# Patient Record
Sex: MALE | Race: WHITE | Employment: FULL TIME | URBAN - METROPOLITAN AREA
[De-identification: names, ages, dates, MRNs, and addresses within clinical notes are randomized per-mention and may not be internally consistent; named-entity substitution may affect disease eponyms.]

---

## 2017-05-14 ENCOUNTER — HOSPITAL ENCOUNTER (EMERGENCY)
Age: 27
Discharge: HOME | End: 2017-05-14
Attending: EMERGENCY MEDICINE
Payer: COMMERCIAL

## 2017-05-14 ENCOUNTER — APPOINTMENT (OUTPATIENT)
Dept: GENERAL RADIOLOGY | Age: 27
End: 2017-05-14
Attending: EMERGENCY MEDICINE
Payer: COMMERCIAL

## 2017-05-14 VITALS
SYSTOLIC BLOOD PRESSURE: 115 MMHG | HEIGHT: 75 IN | TEMPERATURE: 99 F | HEART RATE: 76 BPM | BODY MASS INDEX: 28.6 KG/M2 | OXYGEN SATURATION: 96 % | DIASTOLIC BLOOD PRESSURE: 53 MMHG | RESPIRATION RATE: 16 BRPM | WEIGHT: 230 LBS

## 2017-05-14 DIAGNOSIS — K52.9 GASTROENTERITIS: ICD-10-CM

## 2017-05-14 DIAGNOSIS — B34.9 VIRAL SYNDROME: Primary | ICD-10-CM

## 2017-05-14 PROCEDURE — 71020 XR CHEST PA + LAT CHEST (CPT=71020): CPT | Performed by: EMERGENCY MEDICINE

## 2017-05-14 PROCEDURE — 87081 CULTURE SCREEN ONLY: CPT | Performed by: EMERGENCY MEDICINE

## 2017-05-14 PROCEDURE — 80053 COMPREHEN METABOLIC PANEL: CPT | Performed by: EMERGENCY MEDICINE

## 2017-05-14 PROCEDURE — 96361 HYDRATE IV INFUSION ADD-ON: CPT

## 2017-05-14 PROCEDURE — 99284 EMERGENCY DEPT VISIT MOD MDM: CPT

## 2017-05-14 PROCEDURE — 87430 STREP A AG IA: CPT | Performed by: EMERGENCY MEDICINE

## 2017-05-14 PROCEDURE — 86403 PARTICLE AGGLUT ANTBDY SCRN: CPT | Performed by: EMERGENCY MEDICINE

## 2017-05-14 PROCEDURE — 96374 THER/PROPH/DIAG INJ IV PUSH: CPT

## 2017-05-14 PROCEDURE — 85025 COMPLETE CBC W/AUTO DIFF WBC: CPT | Performed by: EMERGENCY MEDICINE

## 2017-05-14 PROCEDURE — 96376 TX/PRO/DX INJ SAME DRUG ADON: CPT

## 2017-05-14 PROCEDURE — 96375 TX/PRO/DX INJ NEW DRUG ADDON: CPT

## 2017-05-14 RX ORDER — DEXTROSE AND SODIUM CHLORIDE 5; .9 G/100ML; G/100ML
INJECTION, SOLUTION INTRAVENOUS ONCE
Status: COMPLETED | OUTPATIENT
Start: 2017-05-14 | End: 2017-05-14

## 2017-05-14 RX ORDER — ONDANSETRON 2 MG/ML
4 INJECTION INTRAMUSCULAR; INTRAVENOUS ONCE
Status: COMPLETED | OUTPATIENT
Start: 2017-05-14 | End: 2017-05-14

## 2017-05-14 RX ORDER — ACETAMINOPHEN 500 MG
1000 TABLET ORAL ONCE
Status: COMPLETED | OUTPATIENT
Start: 2017-05-14 | End: 2017-05-14

## 2017-05-14 RX ORDER — KETOROLAC TROMETHAMINE 30 MG/ML
30 INJECTION, SOLUTION INTRAMUSCULAR; INTRAVENOUS ONCE
Status: COMPLETED | OUTPATIENT
Start: 2017-05-14 | End: 2017-05-14

## 2017-05-14 RX ORDER — ONDANSETRON 4 MG/1
4 TABLET, ORALLY DISINTEGRATING ORAL EVERY 4 HOURS PRN
Qty: 10 TABLET | Refills: 0 | Status: SHIPPED | OUTPATIENT
Start: 2017-05-14 | End: 2017-05-21

## 2017-05-14 NOTE — ED PROVIDER NOTES
Patient Seen in: [facility] In [place]    History   Patient presents with:  Cough/URI  Nausea/Vomiting/Diarrhea (gastrointestinal)    Stated Complaint: head congestion, vomiting, body aches    HPI    Patient is a 68-year-old male, pre are intact. Pupils are equal and reactive to light. Oropharynx is pink and moist.  NECK: Neck is supple and nontender. The trachea is midline. LUNGS: Lungs are clear to auscultation bilaterally, respirations are unlabored.    HEART: Regular rate and rhyth HISTORY: (As transcribed by Technologist)  Patient has a cough, chest congestion, vomiting and body aches for the past two days. FINDINGS:  Normal heart size and pulmonary vascularity. No pleural effusion or pneumothorax. No lobar consolidation.       5/ [facility] [address]  [phone]    Schedule an appointment as soon as possible for a visit in 3 days        Medications Prescribed:  Discharge Medication List as of 5/14/2017  2:19 PM    START taking these medications    ondansetron 4 MG Oral Table

## 2017-06-28 ENCOUNTER — HOSPITAL ENCOUNTER (EMERGENCY)
Age: 27
Discharge: HOME | End: 2017-06-28
Attending: EMERGENCY MEDICINE
Payer: COMMERCIAL

## 2017-06-28 VITALS
SYSTOLIC BLOOD PRESSURE: 106 MMHG | HEIGHT: 75 IN | WEIGHT: 225 LBS | DIASTOLIC BLOOD PRESSURE: 59 MMHG | OXYGEN SATURATION: 98 % | HEART RATE: 59 BPM | RESPIRATION RATE: 16 BRPM | BODY MASS INDEX: 27.98 KG/M2 | TEMPERATURE: 98 F

## 2017-06-28 DIAGNOSIS — R53.81 MALAISE: ICD-10-CM

## 2017-06-28 DIAGNOSIS — R11.0 NAUSEA: Primary | ICD-10-CM

## 2017-06-28 DIAGNOSIS — B09 VIRAL EXANTHEM: ICD-10-CM

## 2017-06-28 PROCEDURE — 99284 EMERGENCY DEPT VISIT MOD MDM: CPT

## 2017-06-28 PROCEDURE — 96374 THER/PROPH/DIAG INJ IV PUSH: CPT

## 2017-06-28 PROCEDURE — 87081 CULTURE SCREEN ONLY: CPT | Performed by: EMERGENCY MEDICINE

## 2017-06-28 PROCEDURE — 96361 HYDRATE IV INFUSION ADD-ON: CPT

## 2017-06-28 PROCEDURE — 85025 COMPLETE CBC W/AUTO DIFF WBC: CPT | Performed by: EMERGENCY MEDICINE

## 2017-06-28 PROCEDURE — 87430 STREP A AG IA: CPT | Performed by: EMERGENCY MEDICINE

## 2017-06-28 PROCEDURE — 86403 PARTICLE AGGLUT ANTBDY SCRN: CPT | Performed by: EMERGENCY MEDICINE

## 2017-06-28 PROCEDURE — 80053 COMPREHEN METABOLIC PANEL: CPT | Performed by: EMERGENCY MEDICINE

## 2017-06-28 RX ORDER — ONDANSETRON 8 MG/1
8 TABLET, ORALLY DISINTEGRATING ORAL EVERY 6 HOURS PRN
Qty: 10 TABLET | Refills: 0 | Status: SHIPPED | OUTPATIENT
Start: 2017-06-28 | End: 2017-07-05

## 2017-06-28 RX ORDER — ONDANSETRON 2 MG/ML
4 INJECTION INTRAMUSCULAR; INTRAVENOUS
Status: DISCONTINUED | OUTPATIENT
Start: 2017-06-28 | End: 2017-06-28

## 2017-06-28 RX ORDER — SODIUM CHLORIDE 9 MG/ML
INJECTION, SOLUTION INTRAVENOUS ONCE
Status: DISCONTINUED | OUTPATIENT
Start: 2017-06-28 | End: 2017-06-28

## 2017-06-28 NOTE — ED PROVIDER NOTES
Patient Seen in: THE [facility] Emergency Department In [place]    History   Patient presents with:  Nausea/Vomiting/Diarrhea (gastrointestinal)    Stated Complaint: nausea/poor appetite    HPI    Patient complains of feeling generally weak with nausea.   Nataliia is breathing comfortably and appears in no distress. Eyes: sclera white, conjunctiva pink and moist .  Lids and lashes are normal.  Nose: Unremarkable without purulent nasal secretions or overlying sinus erythema.   Throat: Posterior pharynx is erythematou ============================================================  ED Course  ------------------------------------------------------------  MDM   Patient complains of generalized weakness and malaise following a hangover after a bachelor party.   His sympt

## 2018-10-11 ENCOUNTER — HOSPITAL ENCOUNTER (OUTPATIENT)
Facility: HOSPITAL | Age: 28
Setting detail: OBSERVATION
Discharge: HOME | End: 2018-10-14
Attending: EMERGENCY MEDICINE | Admitting: INTERNAL MEDICINE
Payer: COMMERCIAL

## 2018-10-11 DIAGNOSIS — M62.82 NON-TRAUMATIC RHABDOMYOLYSIS: Primary | ICD-10-CM

## 2018-10-11 RX ORDER — DEXTROAMPHETAMINE SACCHARATE, AMPHETAMINE ASPARTATE MONOHYDRATE, DEXTROAMPHETAMINE SULFATE AND AMPHETAMINE SULFATE 5; 5; 5; 5 MG/1; MG/1; MG/1; MG/1
10 CAPSULE, EXTENDED RELEASE ORAL EVERY MORNING
COMMUNITY

## 2018-10-12 PROCEDURE — 99220 INITIAL OBSERVATION CARE,LEVL III: CPT | Performed by: INTERNAL MEDICINE

## 2018-10-12 RX ORDER — SODIUM CHLORIDE 9 MG/ML
INJECTION, SOLUTION INTRAVENOUS CONTINUOUS
Status: ACTIVE | OUTPATIENT
Start: 2018-10-12 | End: 2018-10-12

## 2018-10-12 RX ORDER — POTASSIUM CHLORIDE 20 MEQ/1
40 TABLET, EXTENDED RELEASE ORAL EVERY 4 HOURS
Status: COMPLETED | OUTPATIENT
Start: 2018-10-12 | End: 2018-10-12

## 2018-10-12 RX ORDER — ENOXAPARIN SODIUM 100 MG/ML
40 INJECTION SUBCUTANEOUS DAILY
Status: DISCONTINUED | OUTPATIENT
Start: 2018-10-12 | End: 2018-10-14

## 2018-10-12 RX ORDER — SODIUM CHLORIDE 9 MG/ML
INJECTION, SOLUTION INTRAVENOUS CONTINUOUS
Status: DISCONTINUED | OUTPATIENT
Start: 2018-10-12 | End: 2018-10-14

## 2018-10-12 RX ORDER — DEXTROAMPHETAMINE SACCHARATE, AMPHETAMINE ASPARTATE, DEXTROAMPHETAMINE SULFATE AND AMPHETAMINE SULFATE 2.5; 2.5; 2.5; 2.5 MG/1; MG/1; MG/1; MG/1
10 TABLET ORAL
Status: DISCONTINUED | OUTPATIENT
Start: 2018-10-12 | End: 2018-10-14

## 2018-10-12 RX ORDER — ACETAMINOPHEN 325 MG/1
650 TABLET ORAL EVERY 6 HOURS PRN
Status: DISCONTINUED | OUTPATIENT
Start: 2018-10-12 | End: 2018-10-14

## 2018-10-12 NOTE — ED PROVIDER NOTES
Patient Seen in: [facility] Emergency Department In [place]    History   Patient presents with:  Urinary Symptoms (urologic)    Stated Complaint: Dark Urine    HPI    Patient was lifting weights several days ago.   He had not been lifting for a while and d Result Value    Blood Urine Large (*)     Protein Urine 30 mg/dL (*)     All other components within normal limits   BASIC METABOLIC PANEL (8) - Abnormal; Notable for the following components:    Glucose 126 (*)     Calcium, Total 8.1 (*)     All oth rhabdomyolysis M62.82 10/11/2018 Unknown

## 2018-10-12 NOTE — PLAN OF CARE
NURSING ADMISSION NOTE      Patient admitted via wheelchair. Oriented to room. Safety precautions initiated. Bed in low position. Call light in reach. Patient admitted from [facility] ED due to Non-traumatic Rhabdomyolysis.  Received admitting orde

## 2018-10-12 NOTE — ED NOTES
Report called to [facility], room 404 ready.  Pt to transport to [facility] in private car per MD.  Pt total input 4000 ml IV  Pt total urine output 750 ml

## 2018-10-12 NOTE — H&P
[facility]OSPITAL[facility]  History and Physical     Julianneanshu Hinson Patient Status:  Observation    1990 MRN [id number]   [facility] 4NW-A Attending Joe Knox MD   Hosp Day # 0 PCP Jake Schwartz MD     Chief Complaint: arm soreness, hematur bruits. Respiratory: Clear to auscultation bilaterally. No wheezes. No rhonchi. Cardiovascular: S1, S2. Regular rate and rhythm. No murmurs, rubs or gallops. Equal pulses. Chest and Back: No tenderness or deformity.   Abdomen: Soft, nontender, nondisten

## 2018-10-13 PROCEDURE — 99225 SUBSEQUENT OBSERVATION CARE: CPT | Performed by: HOSPITALIST

## 2018-10-13 NOTE — PLAN OF CARE
Assumed patient care at [address] [place] [address]. IVF continuous at 150ml/hr. Urine output in good amount, yellow in color. No complaints of pain. Room air, not in respiratory distress. Ambulates on the hallways with significant other. Vital signs stable. Afebrile.  Call lig

## 2018-10-13 NOTE — PROGRESS NOTES
[facility]  Progress Note     Diana Jacob Patient Status:  Observation    1990 MRN [id number]   [facility] 4NW-A Attending Christine Fenton MD   Hosp Day # 0 PCP Loren Malcolm MD     Chief Complaint: body pain    S: Patient feels be IVF with NS @150 cc/hr  2. He has received boluses and bicarb in ER  3. K and Cr fortunately are within the normal range  4. Will repeat BMP and CK  5.  Monitor U.O  6. Improving overall, CKT still 20k, will monitor and hopefully d/c in am     Quality:  · D

## 2018-10-14 VITALS
HEART RATE: 52 BPM | TEMPERATURE: 98 F | DIASTOLIC BLOOD PRESSURE: 61 MMHG | SYSTOLIC BLOOD PRESSURE: 130 MMHG | HEIGHT: 75 IN | OXYGEN SATURATION: 98 % | RESPIRATION RATE: 18 BRPM | BODY MASS INDEX: 30.76 KG/M2 | WEIGHT: 247.38 LBS

## 2018-10-14 PROCEDURE — 99217 OBSERVATION CARE DISCHARGE: CPT | Performed by: HOSPITALIST

## 2018-10-14 NOTE — PROGRESS NOTES
Dc patient in stable condition,Saline lock removed. Instructed on follow up appointment with his primary,  Some few restriction upon discharge,Verbalized understanding.

## 2018-10-14 NOTE — PLAN OF CARE
GENITOURINARY - ADULT    • Absence of urinary retention Progressing        METABOLIC/FLUID AND ELECTROLYTES - ADULT    • Electrolytes maintained within normal limits Progressing        Pt aox4. Voiding good amount. CK improving . NS infusing at 150cc/hr. No c/

## 2018-10-14 NOTE — DISCHARGE SUMMARY
[facility]  DISCHARGE SUMMARY     [facility] Patient Status:  Observation    1990 MRN [id number]   [facility] 4NW-A Attending Deepa Echevarria MD   Hosp Day # 0 PCP Kit MD Lee     Date of Admission: 10/11/2018  Date of [address]  130/61    Physical Exam:    General: No acute distress. Respiratory: Clear to auscultation bilaterally. No wheezes. No rhonchi. Cardiovascular: S1, S2. Regular rate and rhythm. No murmurs, rubs or gallops. Abdomen: Soft, nontender, nondistended.   Posi

## 2018-10-14 NOTE — PROGRESS NOTES
[facility] [address]  [place], [address]  ?  10/14/18  ? Re: Rossana Chavez  ? To Whom It May Concern:    Rossana Chavez was admitted to [facility] from 10/11/2018 to 10/14/18.     Please excuse Rossana Chavez from attending work for the

## 2018-10-14 NOTE — PLAN OF CARE
GENITOURINARY - ADULT    • Absence of urinary retention Adequate for Discharge        METABOLIC/FLUID AND ELECTROLYTES - ADULT    • Electrolytes maintained within normal limits Adequate for Discharge        Patient/Family Goals    • Patient/Family Long Ter

## 2019-03-26 ENCOUNTER — HOSPITAL ENCOUNTER (EMERGENCY)
Age: 29
Discharge: HOME | End: 2019-03-26
Attending: EMERGENCY MEDICINE
Payer: COMMERCIAL

## 2019-03-26 ENCOUNTER — APPOINTMENT (OUTPATIENT)
Dept: GENERAL RADIOLOGY | Age: 29
End: 2019-03-26
Attending: NURSE PRACTITIONER
Payer: COMMERCIAL

## 2019-03-26 ENCOUNTER — OFFICE VISIT (OUTPATIENT)
Dept: FAMILY MEDICINE CLINIC | Facility: CLINIC | Age: 29
End: 2019-03-26
Payer: COMMERCIAL

## 2019-03-26 VITALS
DIASTOLIC BLOOD PRESSURE: 80 MMHG | HEIGHT: 77 IN | WEIGHT: 240 LBS | RESPIRATION RATE: 20 BRPM | OXYGEN SATURATION: 99 % | HEART RATE: 82 BPM | BODY MASS INDEX: 28.34 KG/M2 | SYSTOLIC BLOOD PRESSURE: 132 MMHG | TEMPERATURE: 98 F

## 2019-03-26 VITALS
DIASTOLIC BLOOD PRESSURE: 78 MMHG | HEART RATE: 82 BPM | TEMPERATURE: 98 F | RESPIRATION RATE: 18 BRPM | BODY MASS INDEX: 28.3 KG/M2 | HEIGHT: 75.5 IN | WEIGHT: 230 LBS | OXYGEN SATURATION: 99 % | SYSTOLIC BLOOD PRESSURE: 128 MMHG

## 2019-03-26 DIAGNOSIS — S61.459A DOG BITE OF HAND, UNSPECIFIED LATERALITY, INITIAL ENCOUNTER: Primary | ICD-10-CM

## 2019-03-26 DIAGNOSIS — Z02.9 ADMINISTRATIVE ENCOUNTER: Primary | ICD-10-CM
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
## 2019-03-26 LAB
ANION GAP SERPL CALC-SCNC: 6 MMOL/L (ref 0–18)
BASOPHILS # BLD AUTO: 0.04 X10(3) UL (ref 0–0.2)
BASOPHILS NFR BLD AUTO: 0.3 %
BUN BLD-MCNC: 10 MG/DL (ref 7–18)
BUN/CREAT SERPL: 10.6 (ref 10–20)
CALCIUM BLD-MCNC: 8.7 MG/DL (ref 8.5–10.1)
CHLORIDE SERPL-SCNC: 104 MMOL/L (ref 98–107)
CO2 SERPL-SCNC: 28 MMOL/L (ref 21–32)
CREAT BLD-MCNC: 0.94 MG/DL (ref 0.7–1.3)
DEPRECATED RDW RBC AUTO: 42.2 FL (ref 35.1–46.3)
EOSINOPHIL # BLD AUTO: 0.03 X10(3) UL (ref 0–0.7)
EOSINOPHIL NFR BLD AUTO: 0.2 %
ERYTHROCYTE [DISTWIDTH] IN BLOOD BY AUTOMATED COUNT: 12 % (ref 11–15)
GLUCOSE BLD-MCNC: 117 MG/DL (ref 70–99)
HCT VFR BLD AUTO: 42.9 % (ref 39–53)
HGB BLD-MCNC: 14.9 G/DL (ref 13–17.5)
IMM GRANULOCYTES # BLD AUTO: 0.06 X10(3) UL (ref 0–1)
IMM GRANULOCYTES NFR BLD: 0.5 %
LYMPHOCYTES # BLD AUTO: 1.27 X10(3) UL (ref 1–4)
LYMPHOCYTES NFR BLD AUTO: 9.6 %
MCH RBC QN AUTO: 33.4 PG (ref 26–34)
MCHC RBC AUTO-ENTMCNC: 34.7 G/DL (ref 31–37)
MCV RBC AUTO: 96.2 FL (ref 80–100)
MONOCYTES # BLD AUTO: 1.53 X10(3) UL (ref 0.1–1)
MONOCYTES NFR BLD AUTO: 11.6 %
NEUTROPHILS # BLD AUTO: 10.29 X10 (3) UL (ref 1.5–7.7)
NEUTROPHILS # BLD AUTO: 10.29 X10(3) UL (ref 1.5–7.7)
NEUTROPHILS NFR BLD AUTO: 77.8 %
OSMOLALITY SERPL CALC.SUM OF ELEC: 286 MOSM/KG (ref 275–295)
PLATELET # BLD AUTO: 241 10(3)UL (ref 150–450)
POTASSIUM SERPL-SCNC: 3.7 MMOL/L (ref 3.5–5.1)
RBC # BLD AUTO: 4.46 X10(6)UL (ref 4.3–5.7)
SODIUM SERPL-SCNC: 138 MMOL/L (ref 136–145)
WBC # BLD AUTO: 13.2 X10(3) UL (ref 4–11)

## 2019-03-26 PROCEDURE — 96367 TX/PROPH/DG ADDL SEQ IV INF: CPT

## 2019-03-26 PROCEDURE — 96365 THER/PROPH/DIAG IV INF INIT: CPT

## 2019-03-26 PROCEDURE — 90471 IMMUNIZATION ADMIN: CPT

## 2019-03-26 PROCEDURE — 73130 X-RAY EXAM OF HAND: CPT | Performed by: NURSE PRACTITIONER

## 2019-03-26 PROCEDURE — 99284 EMERGENCY DEPT VISIT MOD MDM: CPT

## 2019-03-26 PROCEDURE — S0077 INJECTION, CLINDAMYCIN PHOSP: HCPCS | Performed by: NURSE PRACTITIONER

## 2019-03-26 PROCEDURE — 80048 BASIC METABOLIC PNL TOTAL CA: CPT | Performed by: NURSE PRACTITIONER

## 2019-03-26 PROCEDURE — 85025 COMPLETE CBC W/AUTO DIFF WBC: CPT | Performed by: NURSE PRACTITIONER

## 2019-03-26 RX ORDER — DOXYCYCLINE HYCLATE 100 MG/1
100 CAPSULE ORAL 2 TIMES DAILY
Qty: 20 CAPSULE | Refills: 0 | Status: SHIPPED | OUTPATIENT
Start: 2019-03-26 | End: 2019-04-05

## 2019-03-26 RX ORDER — DEXTROAMPHETAMINE SACCHARATE, AMPHETAMINE ASPARTATE, DEXTROAMPHETAMINE SULFATE AND AMPHETAMINE SULFATE 5; 5; 5; 5 MG/1; MG/1; MG/1; MG/1
TABLET ORAL
Refills: 0 | COMMUNITY
Start: 2019-02-25

## 2019-03-26 RX ORDER — TRAMADOL HYDROCHLORIDE 50 MG/1
50 TABLET ORAL EVERY 6 HOURS PRN
Qty: 10 TABLET | Refills: 0 | Status: SHIPPED | OUTPATIENT
Start: 2019-03-26

## 2019-03-26 RX ORDER — HYDROCODONE BITARTRATE AND ACETAMINOPHEN 5; 325 MG/1; MG/1
1 TABLET ORAL ONCE
Status: COMPLETED | OUTPATIENT
Start: 2019-03-26 | End: 2019-03-26

## 2019-03-26 RX ORDER — CLINDAMYCIN HYDROCHLORIDE 300 MG/1
300 CAPSULE ORAL 3 TIMES DAILY
Qty: 30 CAPSULE | Refills: 0 | Status: SHIPPED | OUTPATIENT
Start: 2019-03-26 | End: 2019-04-05

## 2019-03-26 RX ORDER — MULTIVITAMIN
1 TABLET ORAL DAILY
COMMUNITY

## 2019-03-26 RX ORDER — CLINDAMYCIN PHOSPHATE 600 MG/50ML
600 INJECTION INTRAVENOUS ONCE
Status: COMPLETED | OUTPATIENT
Start: 2019-03-26 | End: 2019-03-26

## 2019-03-26 NOTE — ED INITIAL ASSESSMENT (HPI)
Dog bite- puncture wounds to B hands. Left hand now red/swollen and difficult to open/close. This all happened 3 days ago. No fevers. Sent from [facility] for possible IV antibiotics and XR.

## 2019-03-26 NOTE — ED NOTES
I reviewed that chart and discussed the case. I have examined the patient and noted   patient states that his dog was having a seizure he actually try to prevent the dog from aspirating he stuck his hands and the dog. The patient states the dog bit him. guarding. We will check some basic blood work, give him his tetanus, IV fluids, start IV antibiotics. And get x-rays. I discussed that he would need to really close follow-up with hand surgery.   Or to return to the main hospital of his there is any prog

## 2019-03-26 NOTE — ED PROVIDER NOTES
Patient Seen in: [facility] In [place]    History   Patient presents with:  Bite (integumentary)    Stated Complaint: dog bite to left hand - swelling     16-year-old male presents today with multiple puncture wounds due to dog bite t signs reviewed. All other systems reviewed and negative except as noted above.     Physical Exam     ED Triage Vitals [03/26/19 1236]   /78   Pulse 82   Resp 18   Temp 98.3 °F (36.8 °C)   Temp src Oral   SpO2 99 %   O2 Device None (Room air) created for panel order CBC WITH DIFFERENTIAL WITH PLATELET.   Procedure                               Abnormality         Status                     ---------                               -----------         ------                     CBC W/ DIFFERENTIAL[ pm    Follow-up:  Roseanna Arechiga, [address] [phone] 97 94    In 3 days  For wound re-check        Medications Prescribed:  Current Discharge Medication List    START taking these medications    Clindamycin HCl 300 MG

## 2019-03-26 NOTE — PROGRESS NOTES
Pt presented with multiple puncture wounds to bilateral hands from his dog. Patient reports 2 nights ago his dog had a seizure and patient put his hands in the dogs mouth \"to prevent him from swallowing his tongue\" and the dog clamped down on his hands.

## 2021-08-01 ENCOUNTER — WALK IN (OUTPATIENT)
Dept: URGENT CARE | Age: 31
End: 2021-08-01

## 2021-08-01 VITALS
OXYGEN SATURATION: 99 % | WEIGHT: 255 LBS | BODY MASS INDEX: 31.71 KG/M2 | TEMPERATURE: 99 F | HEART RATE: 71 BPM | SYSTOLIC BLOOD PRESSURE: 128 MMHG | RESPIRATION RATE: 16 BRPM | HEIGHT: 75 IN | DIASTOLIC BLOOD PRESSURE: 84 MMHG

## 2021-08-01 DIAGNOSIS — R09.81 NASAL CONGESTION: ICD-10-CM

## 2021-08-01 DIAGNOSIS — B34.9 VIRAL ILLNESS: Primary | ICD-10-CM

## 2021-08-01 LAB
SARS-COV+SARS-COV-2 AG RESP QL IA.RAPID: NOT DETECTED
SERVICE CMNT-IMP: NORMAL

## 2021-08-01 PROCEDURE — 87426 SARSCOV CORONAVIRUS AG IA: CPT | Performed by: INTERNAL MEDICINE

## 2021-08-01 PROCEDURE — 99202 OFFICE O/P NEW SF 15 MIN: CPT | Performed by: FAMILY MEDICINE

## 2021-08-01 RX ORDER — DEXTROAMPHETAMINE SACCHARATE, AMPHETAMINE ASPARTATE, DEXTROAMPHETAMINE SULFATE AND AMPHETAMINE SULFATE 7.5; 7.5; 7.5; 7.5 MG/1; MG/1; MG/1; MG/1
1 TABLET ORAL 2 TIMES DAILY
COMMUNITY
Start: 2021-06-23

## 2021-08-01 ASSESSMENT — ENCOUNTER SYMPTOMS
HEADACHES: 0
WHEEZING: 0
CHEST TIGHTNESS: 0
COUGH: 1
CHOKING: 0
TROUBLE SWALLOWING: 0
LIGHT-HEADEDNESS: 0
RHINORRHEA: 1
SINUS PRESSURE: 0
SHORTNESS OF BREATH: 0
DIZZINESS: 0
APPETITE CHANGE: 0
DIAPHORESIS: 0
EYE DISCHARGE: 0
ABDOMINAL PAIN: 0
EYE REDNESS: 0
CHILLS: 1
SORE THROAT: 1
DIARRHEA: 0
FEVER: 0
VOMITING: 0
FATIGUE: 1

## 2021-11-19 ENCOUNTER — WALK IN (OUTPATIENT)
Dept: URGENT CARE | Age: 31
End: 2021-11-19

## 2021-11-19 VITALS
TEMPERATURE: 97.7 F | BODY MASS INDEX: 31.87 KG/M2 | DIASTOLIC BLOOD PRESSURE: 80 MMHG | SYSTOLIC BLOOD PRESSURE: 134 MMHG | HEART RATE: 95 BPM | RESPIRATION RATE: 14 BRPM | WEIGHT: 255 LBS | OXYGEN SATURATION: 98 %

## 2021-11-19 DIAGNOSIS — R53.83 FATIGUE, UNSPECIFIED TYPE: ICD-10-CM

## 2021-11-19 DIAGNOSIS — U07.1 COVID-19 VIRUS DETECTED: Primary | ICD-10-CM

## 2021-11-19 DIAGNOSIS — R52 BODY ACHES: ICD-10-CM

## 2021-11-19 DIAGNOSIS — R09.81 NASAL CONGESTION: ICD-10-CM

## 2021-11-19 DIAGNOSIS — R11.2 NAUSEA AND VOMITING, INTRACTABILITY OF VOMITING NOT SPECIFIED, UNSPECIFIED VOMITING TYPE: ICD-10-CM

## 2021-11-19 LAB
INFLUENZA A ANTIGEN: NOT DETECTED
INFLUENZA B ANTIGEN: NOT DETECTED
SARS-COV+SARS-COV-2 AG RESP QL IA.RAPID: DETECTED
SERVICE CMNT-IMP: ABNORMAL

## 2021-11-19 PROCEDURE — 87804 INFLUENZA ASSAY W/OPTIC: CPT | Performed by: INTERNAL MEDICINE

## 2021-11-19 PROCEDURE — 87426 SARSCOV CORONAVIRUS AG IA: CPT | Performed by: INTERNAL MEDICINE

## 2021-11-19 PROCEDURE — 99213 OFFICE O/P EST LOW 20 MIN: CPT | Performed by: PHYSICIAN ASSISTANT

## 2021-11-19 ASSESSMENT — ENCOUNTER SYMPTOMS
ABDOMINAL PAIN: 1
COUGH: 0
FATIGUE: 1
SORE THROAT: 0
FEVER: 1
VOMITING: 1
DIARRHEA: 0
LIGHT-HEADEDNESS: 0
DIZZINESS: 0
CHILLS: 1
NAUSEA: 1
SHORTNESS OF BREATH: 0

## 2024-10-23 ENCOUNTER — WALK IN (OUTPATIENT)
Dept: URGENT CARE | Age: 34
End: 2024-10-23

## 2024-10-23 VITALS
TEMPERATURE: 97.7 F | WEIGHT: 249.5 LBS | HEART RATE: 74 BPM | DIASTOLIC BLOOD PRESSURE: 65 MMHG | OXYGEN SATURATION: 99 % | BODY MASS INDEX: 31.19 KG/M2 | SYSTOLIC BLOOD PRESSURE: 120 MMHG | RESPIRATION RATE: 16 BRPM

## 2024-10-23 DIAGNOSIS — J02.9 SORE THROAT: Primary | ICD-10-CM

## 2024-10-23 LAB
S PYO DNA THROAT QL NAA+PROBE: NOT DETECTED
TEST LOT EXPIRATION DATE: NORMAL
TEST LOT NUMBER: NORMAL

## 2024-10-23 PROCEDURE — 87651 STREP A DNA AMP PROBE: CPT | Performed by: PHYSICIAN ASSISTANT

## 2024-10-23 PROCEDURE — 99213 OFFICE O/P EST LOW 20 MIN: CPT | Performed by: PHYSICIAN ASSISTANT

## (undated) NOTE — ED AVS SNAPSHOT
[facility] in [address] [facility]    Phone:  [phone]    Fax:  [address] [place] [address]   MRN: [id number]    Department:  [facility] in [place]   Date of Visit:  5/ IF THERE IS ANY CHANGE OR WORSENING OF YOUR CONDITION, CALL YOUR PRIMARY CARE PHYSICIAN AT ONCE OR RETURN IMMEDIATELY TO THE EMERGENCY DEPARTMENT.     If you have been prescribed any medication(s), please fill your prescription right away and begin taking t

## (undated) NOTE — ED AVS SNAPSHOT
THE [facility] Emergency Department in [facility] [address]  Phone:  [phone]  Fax:  [phone] [facility]   MRN: [id number]    Department:  THE [facility] Emergency Department in [place]   Date of Visit:  6/28/2017 IF THERE IS ANY CHANGE OR WORSENING OF YOUR CONDITION, CALL YOUR PRIMARY CARE PHYSICIAN AT ONCE OR RETURN IMMEDIATELY TO THE EMERGENCY DEPARTMENT.     If you have been prescribed any medication(s), please fill your prescription right away and begin taking t

## (undated) NOTE — LETTER
Date & Time: 3/29/2019, 10:51 AM  Patient: Isamar aWn  Encounter Provider(s):    MD Jeuss Manuel Penn APRN       To Whom It May Concern:    Ernesto Crow was seen and treated in our department on 3/26/2019.  Please excuse from work through

## (undated) NOTE — LETTER
May 14, 2017    Patient: Devere Opitz   Date of Visit: 5/14/2017       To Whom It May Concern:    Graciela Hankins was seen and treated in our emergency department on 5/14/2017. He should not return to work until 5/17/2017.     If you have any questions or conc

## (undated) NOTE — ED AVS SNAPSHOT
Talita Gómez   MRN: [id number]    Department:  [facility] in [place]   Date of Visit:  3/26/2019           Disclosure     Insurance plans vary and the physician(s) referred by the ER may not be covered by your plan.  Please contact yo tell this physician (or your personal doctor if your instructions are to return to your personal doctor) about any new or lasting problems. The primary care or specialist physician will see patients referred from the [facility] Emergency Department.  Mary Rueda

## (undated) NOTE — LETTER
June 28, 2017    Patient: Diana Jacob   Date of Visit: 6/28/2017       To Whom It May Concern:    Ricco Mendez was seen and treated in our emergency department on 6/28/2017. He may return to work on 07/01/2017.     If you have any questions or concerns, pl

## (undated) NOTE — ED AVS SNAPSHOT
[facility] in [address] [facility]    Phone:  [phone]    Fax:  [address] [place] [address]   MRN: [id number]    Department:  [facility] in [place]   Date of Visit:  5/ have any questions regarding your home medications, including potential side effects.               Medication List      START taking these medications     ondansetron 4 MG Tbdp   Quantity:  10 tablet   Commonly known as:  ZOFRAN-ODT   Take 1 tablet (4 mg t to a primary care or a specialist physician for a follow-up visit, please tell this physician (or your personal doctor if your instructions are to return to your personal doctor) about any new or lasting problems.  The primary care or specialist physician w We are concerned for your overall well being:    - If you are a smoker or have smoked in the last 12 months, we encourage you to explore options for quitting.     - If you have concerns related to behavioral health issues or thoughts of harming yourself, Enter your [employer] Activation Code exactly as it appears below along with your Zip Code and Date of Birth to complete the sign-up process. If you do not sign up before the expiration date, you must request a new code.     Your unique [employer] Access Code: [email or web address]